# Patient Record
Sex: FEMALE | Race: WHITE | URBAN - METROPOLITAN AREA
[De-identification: names, ages, dates, MRNs, and addresses within clinical notes are randomized per-mention and may not be internally consistent; named-entity substitution may affect disease eponyms.]

---

## 2017-09-04 ENCOUNTER — OFFICE VISIT (OUTPATIENT)
Dept: FAMILY MEDICINE CLINIC | Facility: CLINIC | Age: 37
End: 2017-09-04

## 2017-09-04 VITALS
DIASTOLIC BLOOD PRESSURE: 72 MMHG | TEMPERATURE: 98 F | RESPIRATION RATE: 18 BRPM | WEIGHT: 153 LBS | SYSTOLIC BLOOD PRESSURE: 108 MMHG | HEART RATE: 80 BPM

## 2017-09-04 DIAGNOSIS — T63.441A BEE STING, ACCIDENTAL OR UNINTENTIONAL, INITIAL ENCOUNTER: Primary | ICD-10-CM

## 2017-09-04 PROCEDURE — 99203 OFFICE O/P NEW LOW 30 MIN: CPT | Performed by: NURSE PRACTITIONER

## 2017-09-04 RX ORDER — PREDNISONE 10 MG/1
TABLET ORAL
Qty: 10 TABLET | Refills: 0 | Status: SHIPPED | OUTPATIENT
Start: 2017-09-04

## 2017-09-04 RX ORDER — MUPIROCIN CALCIUM 20 MG/G
1 CREAM TOPICAL 2 TIMES DAILY
Qty: 15 G | Refills: 1 | Status: SHIPPED | OUTPATIENT
Start: 2017-09-04 | End: 2017-09-11

## 2017-09-04 NOTE — PROGRESS NOTES
CHIEF COMPLAINT:   Patient presents with:  Sting: believes it was a bee that stung her last night      HPI:     Leah Davis is a 39year old female who presents with concerns of bee sting to left cheek. Patient first noticed symptoms 1 days ago.   Sanya SKIN: Lesion(s): 1 lesion, edematous area is appx 4 x 5 cm located on left cheek, under left eye;+ edema  + erythema, + tenderness with palpation,  No drainage, no sharply demarcated border. No evidence of cutaneous necrosis.    HEAD: atraumatic, normoceph Risks, benefits, and side effects of medication explained and discussed. Patient Instructions       Insect, Spider, and Scorpion Bites and Stings  Most insect bites are harmless and cause only minor swelling or itching.  But if you’re allergic to insects · Try to remove a stinger you can see. Use your fingernail, a knife edge, or credit card to scrape against the skin. Do not squeeze or pull.   · Apply ice or a cold compress to reduce pain and swelling (keep a thin cloth between the cold source and the skin · If you had a severe reaction, the provider may prescribe an epinephrine kit. Epinephrine will stop an allergic reaction from getting worse. Before you leave the hospital, be sure that you understand when and how to use this medicine.   · Diphenhydramine i · Future reactions could be worse than this one, so try to stay away from places where you might be stung again. · Be aware that honeybees nest in trees. Wasps and yellow jackets nest in the ground, trees, or roof eaves.   · If you are stung by a honeybee, · Very drowsy or trouble awakening  · Fainting or loss of consciousness  · Rapid heart rate  · Low blood pressure  · Feeling of doom  · Nausea, vomiting, abdominal pain, or diarrhea  · Vomiting blood, or large amounts of blood in stool  · Seizure  When to

## 2017-09-04 NOTE — PATIENT INSTRUCTIONS
Insect, Spider, and Scorpion Bites and Stings  Most insect bites are harmless and cause only minor swelling or itching. But if you’re allergic to insects such as wasps or bees, a sting can cause a life-threatening allergic reaction.  The venom (poison) fr · Apply ice or a cold compress to reduce pain and swelling (keep a thin cloth between the cold source and the skin). Date Last Reviewed: 11/20/2014  © 7049-0269 [address]. All rights reserved.  Marie Bravo · Diphenhydramine is an oral antihistamine available at drugstores and groceries. Unless a prescription antihistamine was given, you can use this medicine to reduce itching if large areas of the skin are involved.  The medicine may make you sleepy, so be ca · If you are stung by a honeybee, a stinger will remain in your skin. Wasps, yellow jackets, and hornets don’t leave a stinger behind. Move away from the nest area right away.  The stinger of a honeybee releases a substance that will attract other bees to y · Seizure  When to seek medical advice  Call your healthcare provider right away if any of these occur:  · Spreading areas of itching, redness or swelling  · New or worse swelling in the face, eyelids, or  lips  · Dizziness or weakness  Also call your prov

## 2018-08-01 ENCOUNTER — EMPLOYEE HEALTH (OUTPATIENT)
Dept: OCCUPATIONAL MEDICINE | Age: 38
End: 2018-08-01
Attending: PHYSICIAN ASSISTANT

## 2018-08-01 DIAGNOSIS — Z01.84 IMMUNITY STATUS TESTING: Primary | ICD-10-CM

## 2018-08-01 LAB
RUBV IGG SER QL: POSITIVE
RUBV IGG SER-ACNC: 391.7 IU/ML (ref 10–?)

## 2018-08-01 PROCEDURE — 86787 VARICELLA-ZOSTER ANTIBODY: CPT

## 2018-08-01 PROCEDURE — 86735 MUMPS ANTIBODY: CPT

## 2018-08-01 PROCEDURE — 86480 TB TEST CELL IMMUN MEASURE: CPT

## 2018-08-01 PROCEDURE — 86765 RUBEOLA ANTIBODY: CPT

## 2018-08-01 PROCEDURE — 86762 RUBELLA ANTIBODY: CPT

## 2018-08-03 LAB
MEV IGG SER IA-ACNC: POSITIVE
MUV IGG SER IA-ACNC: POSITIVE
VZV IGG SER IA-ACNC: POSITIVE

## 2018-08-08 LAB
M TB TUBERC IFN-G BLD QL: NEGATIVE
M TB TUBERC IFN-G/MITOGEN IGNF BLD: <0 IU/ML
M TB TUBERC IGNF/MITOGEN IGNF CONTROL: >10 IU/ML
MITOGEN IGNF BCKGRD COR BLD-ACNC: 0.03 IU/ML

## 2021-11-26 ENCOUNTER — HOSPITAL ENCOUNTER (OUTPATIENT)
Age: 41
Discharge: HOME | End: 2021-11-27
Attending: OBSTETRICS & GYNECOLOGY | Admitting: OBSTETRICS & GYNECOLOGY

## 2021-11-26 RX ORDER — PNV NO.95/FERROUS FUM/FOLIC AC 28MG-0.8MG
TABLET ORAL
COMMUNITY

## 2021-11-26 RX ORDER — ONDANSETRON 2 MG/ML
4 INJECTION INTRAMUSCULAR; INTRAVENOUS EVERY 12 HOURS PRN
Status: DISCONTINUED | OUTPATIENT
Start: 2021-11-26 | End: 2021-11-27 | Stop reason: HOSPADM

## 2021-11-26 RX ORDER — VITAMIN A ACETATE, BETA CAROTENE, ASCORBIC ACID, CHOLECALCIFEROL, .ALPHA.-TOCOPHEROL ACETATE, DL-, THIAMINE MONONITRATE, RIBOFLAVIN, NIACINAMIDE, PYRIDOXINE HYDROCHLORIDE, FOLIC ACID, CYANOCOBALAMIN, CALCIUM CARBONATE, FERROUS FUMARATE, ZINC OXIDE, CUPRIC OXIDE 3080; 12; 120; 400; 1; 1.84; 3; 20; 22; 920; 25; 200; 27; 10; 2 [IU]/1; UG/1; MG/1; [IU]/1; MG/1; MG/1; MG/1; MG/1; MG/1; [IU]/1; MG/1; MG/1; MG/1; MG/1; MG/1
1 TABLET, FILM COATED ORAL DAILY
COMMUNITY

## 2021-11-26 ASSESSMENT — PAIN SCALES - GENERAL: PAINLEVEL_OUTOF10: 4

## 2021-11-27 VITALS
TEMPERATURE: 98.4 F | HEART RATE: 70 BPM | DIASTOLIC BLOOD PRESSURE: 69 MMHG | WEIGHT: 201 LBS | OXYGEN SATURATION: 100 % | HEIGHT: 67 IN | BODY MASS INDEX: 31.55 KG/M2 | RESPIRATION RATE: 16 BRPM | SYSTOLIC BLOOD PRESSURE: 119 MMHG

## 2021-11-27 LAB
APPEARANCE UR: ABNORMAL
BACTERIA #/AREA URNS HPF: ABNORMAL /HPF
BILIRUB UR QL STRIP: NEGATIVE
COLOR UR: YELLOW
GLUCOSE UR STRIP-MCNC: NEGATIVE MG/DL
HGB UR QL STRIP: NEGATIVE
HYALINE CASTS #/AREA URNS LPF: ABNORMAL /LPF
KETONES UR STRIP-MCNC: ABNORMAL MG/DL
LEUKOCYTE ESTERASE UR QL STRIP: ABNORMAL
MUCOUS THREADS URNS QL MICRO: PRESENT
NITRITE UR QL STRIP: NEGATIVE
PH UR STRIP: 5 [PH] (ref 5–7)
PROT UR STRIP-MCNC: 30 MG/DL
RBC #/AREA URNS HPF: ABNORMAL /HPF
SP GR UR STRIP: 1.02 (ref 1–1.03)
SQUAMOUS #/AREA URNS HPF: ABNORMAL /HPF
UROBILINOGEN UR STRIP-MCNC: 0.2 MG/DL
WBC #/AREA URNS HPF: ABNORMAL /HPF

## 2021-11-27 PROCEDURE — 10002800 HB RX 250 W HCPCS: Performed by: OBSTETRICS & GYNECOLOGY

## 2021-11-27 PROCEDURE — 87086 URINE CULTURE/COLONY COUNT: CPT | Performed by: OBSTETRICS & GYNECOLOGY

## 2021-11-27 PROCEDURE — 96374 THER/PROPH/DIAG INJ IV PUSH: CPT

## 2021-11-27 PROCEDURE — 99202 OFFICE O/P NEW SF 15 MIN: CPT

## 2021-11-27 PROCEDURE — 96361 HYDRATE IV INFUSION ADD-ON: CPT

## 2021-11-27 PROCEDURE — 10002807 HB RX 258: Performed by: OBSTETRICS & GYNECOLOGY

## 2021-11-27 PROCEDURE — 81001 URINALYSIS AUTO W/SCOPE: CPT | Performed by: OBSTETRICS & GYNECOLOGY

## 2021-11-27 RX ADMIN — SODIUM CHLORIDE, POTASSIUM CHLORIDE, SODIUM LACTATE AND CALCIUM CHLORIDE 1000 ML: 600; 310; 30; 20 INJECTION, SOLUTION INTRAVENOUS at 00:03

## 2021-11-27 RX ADMIN — ONDANSETRON 4 MG: 2 INJECTION INTRAMUSCULAR; INTRAVENOUS at 00:04

## 2021-11-28 LAB — BACTERIA UR CULT: NORMAL
